# Patient Record
Sex: MALE | Race: WHITE
[De-identification: names, ages, dates, MRNs, and addresses within clinical notes are randomized per-mention and may not be internally consistent; named-entity substitution may affect disease eponyms.]

---

## 2018-02-05 NOTE — HISTORY & PHYSICAL EXAMINATION
DATE OF ADMISSION:  02/06/2018

 

HISTORY OF PRESENT ILLNESS:  A 27-year-old male presents with ankle pain,

requesting surgical intervention, is located in the right ankle.  Pain is

described as aching pain and soreness.  Condition is graded as a 3 or 4 on a

10-point scale and gets up to about a 7 by the end of the day.  Pain began

several years ago.  He has been having pain with his right ankle since 2012. 

He notes he has torn ligaments in his ankle while in the [employer], is

experiencing aching pains while moving the ankle in certain ways.  He notes

it constantly gets worse throughout the day.  He voiced during activities, it

caused him to have pain.  Past treatment and tests include surgery in 2012,

steroid shots, x-rays, oral medication, physical therapy, ankle brace he had

bought over-the-counter.  The patient had overall pain improvement with his

last surgery in 2012; however, then he was shortly deployed and unable to

completely complete his rehab and physical therapy due to deployment and his

pain returned.  The deployment was to [facility] while he was in active . 

We tried to order an additional MRI, this was denied by his insurance

company.

 

PAST SURGICAL HISTORY:  Ankle surgery in 2012.

 

PAST MEDICAL HISTORY:  Unremarkable.

 

MEDICATIONS:  Naprosyn.

 

ALLERGIES:  No known medical allergies.

 

FAMILY HISTORY:  Unremarkable.

 

SOCIAL HISTORY:  The patient admits to alcohol use, drinking described as

social.

 

REVIEW OF SYSTEMS:  Unremarkable except chief complaint.

 

PHYSICAL EXAMINATION:

VITAL SIGNS:  /82, height 6 feet 2 inches, weight 225 pounds, body mass

index 29.

CONSTITUTIONAL:  The patient appears well developed and nourished with good

attention to body grooming and habitus.

HEAD AND FACE:  Head is normocephalic and atraumatic without any gross head,

face or neck masses.

EYES:  Conjunctival and pupillary reaction to light and accommodation are

normal.

EARS, NOSE, MOUTH AND THROAT:  Unremarkable.  Lips and palate pink and moist

without any lesions, ____ or gingival hypertrophy.

NECK:  Neck is supple.  Trachea is midline without any adenopathy or crepitus

palpable.

CARDIOVASCULAR:  Normal S1, S2, without murmur, gallops, rubs or clicks

noted.  Cardiovascular exam is normal.

RESPIRATORY:  Chest is symmetric.  No scars are visible.  No port or

pacemaker.

LUNGS:  Clear to auscultation bilaterally and equal.

GASTROINTESTINAL:  Abdominal organs, bladder and kidneys show no

abnormalities, masses, tenderness or rigidity.

LYMPHATIC:  No popliteal, inguinal or supraclavicular lymphadenopathy noted.

LOWER EXTREMITY:  DP palpable.  PT palpable.

DERMATOLOGIC:  Unremarkable with no rash, subcutaneous nodules, lesions or

ulcers observed.  There is cicatrix over his ankle.

NEUROLOGICAL:  Touch, pin, vibratory pain, proprioception sensations are

normal.  Deep tendon reflexes normal.

MUSCULOSKELETAL:  Pain is increased on palpation of the right lateral

malleolus and lowest tip of the bone.  Range of motion shows limited end

range of motion, dorsiflexion and plantarflexion.  Pain greater on

dorsiflexion.  Crepitation is noted on attempted range of motion on the right

ankle.  Pain on palpation of the anterolateral and lateral aspect of the

right ankle joint.  Pain on palpation of the anterior talofibular ligament

and calcaneofibular ligament on the right.

 

MRI without contrast 08/30/2006 of the right ankle shows anterior

tibiofibular ligament intact, moderately thickened, there is susceptibility

artifact from prior surgery.  Calcaneofibular intact, mildly thickened,

susceptibility artifact near the attachment of the calcaneofibular ligament.

 

IMPRESSION:

1.  Osteoarthritis right ankle.

2.  Rule out osteochondritis dissecans right ankle.

3.  Difficulty walking.

4.  Pain.

5.  Status post right ankle surgery 2012, question ligamentous repair.

 

PLAN:  Discussed the most common mechanical etiology is discomfort. 

Treatment consists of rest, ice, analgesics, nonsteroidal anti-inflammatory

drugs, physical therapy, steroid shots and orthotics.  Due to the nature and

severity of deformity, he is requesting surgical intervention.  Surgical

procedures to be performed:

1.  Surgical arthroscopy with extensive debridement and possible open

arthrotomy right ankle.

2.  General anesthesia as an outpatient at the hospital.

3.  Procedure, risks and complications were fully reviewed with the patient. 

Consent form, foot diagram and illustration reviewed in all their entirety. 

All of the patient's questions were answered.  Complications were discussed

in detail with the patient including pain, infection, swelling that may or

may not be excessive, pins and needles feeling, numbness, metatarsalgia,

excessive bleeding, delayed or nonhealing of bone, delayed or nonhealing of

skin, enlarged scar, failure of the procedure, recurrence or worsening of

condition which may or may not require further surgery, adverse reaction to

anesthesia, allergic reaction to suture or other implant material, the

patient will be required to be in a surgery shoe for a minimum of 1-2 weeks

and not return to sneaker for 2-4 weeks depending on postop edema.  The

patient is aware this is an elective type procedure and I recommend a second

opinion.  The patient understood.  Consent form was signed with a copy of the

foot diagram issued to the patient.  Verbal and written postop instructions

were given.  The patient will be started on CPM machine immediately

postoperatively if able to be covered by insurance; to date, this has not

happened.  The patient will return to the office for postop check or sooner

if medically necessary.  Instructed to keep dressing clean, dry and intact

until seen at the office.  At time of the preoperative appointment,

prescriptions for Keflex and Percocet were dispensed.

## 2018-02-06 ENCOUNTER — HOSPITAL ENCOUNTER (OUTPATIENT)
Dept: HOSPITAL 45 - C.ACU | Age: 28
Discharge: HOME | End: 2018-02-06
Payer: OTHER GOVERNMENT

## 2018-02-06 VITALS
SYSTOLIC BLOOD PRESSURE: 137 MMHG | OXYGEN SATURATION: 95 % | HEART RATE: 83 BPM | DIASTOLIC BLOOD PRESSURE: 71 MMHG | TEMPERATURE: 97.88 F

## 2018-02-06 VITALS
HEART RATE: 84 BPM | SYSTOLIC BLOOD PRESSURE: 139 MMHG | OXYGEN SATURATION: 99 % | TEMPERATURE: 97.88 F | DIASTOLIC BLOOD PRESSURE: 82 MMHG

## 2018-02-06 VITALS
TEMPERATURE: 97.88 F | OXYGEN SATURATION: 97 % | SYSTOLIC BLOOD PRESSURE: 128 MMHG | DIASTOLIC BLOOD PRESSURE: 80 MMHG | HEART RATE: 90 BPM

## 2018-02-06 VITALS
BODY MASS INDEX: 29.88 KG/M2 | BODY MASS INDEX: 29.88 KG/M2 | BODY MASS INDEX: 29.88 KG/M2 | HEIGHT: 72.99 IN | WEIGHT: 225.47 LBS | WEIGHT: 225.47 LBS | HEIGHT: 72.99 IN

## 2018-02-06 DIAGNOSIS — M19.071: Primary | ICD-10-CM

## 2018-02-06 DIAGNOSIS — M93.271: ICD-10-CM

## 2018-02-06 RX ADMIN — FENTANYL CITRATE PRN MCG: 50 INJECTION, SOLUTION INTRAMUSCULAR; INTRAVENOUS at 10:49

## 2018-02-06 RX ADMIN — FENTANYL CITRATE PRN MCG: 50 INJECTION, SOLUTION INTRAMUSCULAR; INTRAVENOUS at 10:44

## 2018-02-06 RX ADMIN — FENTANYL CITRATE PRN MCG: 50 INJECTION, SOLUTION INTRAMUSCULAR; INTRAVENOUS at 10:39

## 2018-02-06 RX ADMIN — FENTANYL CITRATE PRN MCG: 50 INJECTION, SOLUTION INTRAMUSCULAR; INTRAVENOUS at 10:35

## 2018-02-06 NOTE — DISCHARGE INSTRUCTIONS
Discharge Instructions


Date of Service


Feb 6, 2018.





Admission


Reason for Admission:  Ostechondral Disease Right Ankle





Discharge


Discharge Diagnosis / Problem:  same as diagnosis





Discharge Goals


Goal(s):  Decrease discomfort





Activity Recommendations


Activity Limitations:  as noted below





Medications:





*  Resume previous medications unless instructed by your surgeon.





*  Take your medications as prescribed.  Call our office ([phone]) at any


    time, if you experience severe pain that does not subside shortly after 

taking


    your pain medication.








Activity:





*  You may walk on your operated foot/ankle using the surgical shoe


    or cast/splint.  Do not put any weight on your operated foot/ankle


    without wearing the surgical shoe or cast sandal..








Special Care:





*  Keep your bandage clean and dry.  Do not remove your bandage 


    unless otherwise instructed.  A small amount of blood may appear on


    the bandage over the surgical site.  Call our office ([phone]) if you 


    bandage becomes blood-soaked or wet.





*  Elevate your operated foot/ankle on pillows, above the level of your


    heart, as often as possible during the first 2-3 days following surgery.


    Keep your knee flexed slightly with a pillow under your knee when you


    elevate your foot/ankle.





*  Apply a ice bag to your foot/ankle over the operative site for 20-30


    minutes out of each hour while you are awake.  Do not allow the ice bag


    to directly contact bare skin.





*  Avoid bumping or handling any pins visible in your toes.  If any pin


    feels or appears loose, call the office ([phone]).





* Take your oral temperature in the morning and at bedtime.  Call


   our office ([phone]) if your temperature rises above 101 degrees


   Fahrenheit.





Call your surgeon's office at ([phone]) for any problems or


concerns such as excessive bleeding and/or pain unrelieved by your


prescribed pain medications.





If you have any questions, please do not hesitate to ask them.








Avoid all tobacco products. If you need help to stop smoking, call


[place]'s FREE QUITLINE at 1-967.365.6679.  This is a free call.








Follow-up:





Follow-up with Dr. Gray 





.





Current Hospital Diet


Patient's current hospital diet:





Discharge Diet


Recommended Diet:  Regular Diet





Pending Studies


Studies pending at discharge:  no





Medical Emergencies








.


Who to Call and When:





Medical Emergencies:  If at any time you feel your situation is an emergency, 

please call 911 immediately.





.





Non-Emergent Contact


Non-Emergency issues call your:  Primary Care Provider


.








"Provider Documentation" section prepared by Breana Hernandez.








.





VTE Core Measure


Inpt VTE Proph given/why not?:  Treatment not indicated

## 2018-02-06 NOTE — [facility] OPERATIVE REPORT
Operative Report


Operative Date


Feb 6, 2018.





Pre-Operative Diagnosis





Osteoarthritis right ankle





 Rule out osteochondritis dissecans right ankle





Post-Operative Diagnosis





Osteoarthritis right ankle





Osteochondritis dissecans right ankle





Procedure(s) Performed





Right ankle arthroscopy, right ankle extensive debridement, repair of 


osteoarthritis dissecans and talar lesion





Surgeon


Dr. Gray





Assistant Surgeon(s)


None





Estimated Blood Loss


5 mL





Specimens





No pathology specimens per surgeon





Anesthesia Type


General


I attest to the content of the Intraoperative Record and any orders documented 

therein.  Any exceptions are noted below.

## 2018-02-07 NOTE — OPERATIVE REPORT
DATE OF OPERATION:  02/06/2018

 

SURGEON:  Dr. Gray.

 

PREOPERATIVE DIAGNOSES:

1.  Osteoarthritis, right ankle.

2.  Osteochondritis dissecans, right ankle.

3.  Pain, right ankle.

 

POSTOPERATIVE DIAGNOSES:  Same as above with anterior lateral talar dome

lesion and adhesive and traumatic capsulitis of the left ankle.

 

PROCEDURES:

1.  Surgical arthroscopy of the right ankle with extensive debridement and

pain of osteochondritis dissecans.

2.  Repair of osteochondral defect anterior lateral right ankle.

 

HEMOSTASIS:  None.

 

INJECTABLES:  30 mL of 1% lidocaine with 1:100,000 epinephrine intraarticular

preop and 1.5 mL of dexamethasone sodium phosphate, 4 mg per mL, total of 6

mg, intra-articular postoperatively.

 

ESTIMATED BLOOD LOSS:  Less than 5 mL.

 

FINDINGS:

1.  Osteochondritis dissecans.

2.  Anterior lateral osteochondral lesion over the dome of the talus.

 

COMPLICATIONS:  None.  The patient tolerated the procedure and anesthesia

well without complications, transferred to recovery room with vital signs

stable and neurovascular status intact.

 

DESCRIPTION OF PROCEDURE:  The patient was brought to the OR and placed on

the OR table in the supine position.  Upon completion of anesthesia, general,

a well-padded thigh tourniquet was applied to the right extremity.  It should

be noted that this was not inflated throughout the case.  The extremity was

scrubbed, prepped and draped in the usual aseptic fashion.  Attention was

directed to the medial aspect of left ankle joint.  Positioning was placed

one in a knee almanzar and the joint was placed in Marvin noninvasive

distractor.  Prior to the incision, the joint was inflated with 20 mL of 1%

with 1:1000 epinephrine.  Just medial to the tibias anterior tendon and

inferior to the joint line, a small stab incision was made after carefully

drawn out.  Inferior to the joint line, the deep structures were 

using a small hemostat, blunt trocar and cannula.  The anterior medial and

anterior lateral portals were created.  At this time, inflow was set to 40. 

The camera was placed in the medial portal and a 2-0 Arthrex shaver was

placed over the lateral portal.  Large amount of hypertrophic synovium was

abraded.  The syndesmosis appeared to be intact.  The lateral surface dome

was intact.  There was a lesion in anterior medial down to subchondral bone. 

The camera and the shaver were switched.  It should be noted that the

vaporizer was also used to __________ a large amount of hypertrophic

synovium.  There was a cartilage that was intact over the medial aspect of

the joint; however, there is a large amount of hypertrophic synovium.  The

camera and shaver were again switched for repair of the osteochondritic

lesion with debridement using 2-0 and 3-0 Vicryl _____________ shaver

directly over this anterior to promote fibrocartilage.  The joint was

copiously lavaged with saline and closure began in the deep structures using

3-0 Vicryl in a simple interrupted fashion.  Skin margins were closed using

4-0 nylon and 1.5 mL of dexamethasone phosphate was placed within the joint. 

Dry sterile compressive dressing consisting of Adaptic, 4 x 4's, Nancy and an

Ace were applied.  The patient will be weightbearing postoperatively.

 

 

I attest to the content of the Intraoperative Record and any orders documented therein. Any exception
s are noted below.

## 2025-07-30 NOTE — ANESTHESIA PROGRESS NT - [facility]
Anesthesia Post Op Note


Date & Time


Feb 6, 2018 at 11:10





Vital Signs


Pain Intensity:  0





Vital Signs Past 12 Hours








  Date Time  Temp Pulse Resp B/P (MAP) Pulse Ox O2 Delivery O2 Flow Rate FiO2


 


2/6/18 11:03  90 23  95   


 


2/6/18 11:03  90 23     


 


2/6/18 11:02 36.6 89 20 136/74 96 Room Air 0 


 


2/6/18 11:01    136/74    


 


2/6/18 10:58  87 19  95   


 


2/6/18 10:58  87 19     


 


2/6/18 10:55    143/75    


 


2/6/18 10:53  94 33     


 


2/6/18 10:53  93 33  96   


 


2/6/18 10:50    133/86    


 


2/6/18 10:48  96 22  96   


 


2/6/18 10:48  95 22     


 


2/6/18 10:45    142/74    


 


2/6/18 10:43  102 15  100   


 


2/6/18 10:43  103 15     


 


2/6/18 10:40    137/76    


 


2/6/18 10:38  85 13  99   


 


2/6/18 10:38  86 13     


 


2/6/18 10:35    135/83    


 


2/6/18 10:33  92 19  100   


 


2/6/18 10:33  92 19     


 


2/6/18 10:30    143/84    


 


2/6/18 10:28  87 17  99   


 


2/6/18 10:28  89 17     


 


2/6/18 10:25    119/80    


 


2/6/18 10:23  86 17     


 


2/6/18 10:23  86 17  99   


 


2/6/18 10:20    128/68    


 


2/6/18 10:19    122/69    


 


2/6/18 10:18 36.2 85 20 122/69 99 Oxymask 10 


 


2/6/18 06:48 36.6 84 18 139/82 (101) 99 Room Air  











Notes


Mental Status:  alert / awake / arousable, participated in evaluation


Pt Amnestic to Procedure:  Yes


Nausea / Vomiting:  adequately controlled


Pain:  adequately controlled


Airway Patency, RR, SpO2:  stable & adequate


BP & HR:  stable & adequate


Hydration State:  stable & adequate


Anesthetic Complications:  no major complications apparent [FreeTextEntry1] : Diabetes, Obstructive sleep apnea